# Patient Record
Sex: MALE | Race: BLACK OR AFRICAN AMERICAN | URBAN - METROPOLITAN AREA
[De-identification: names, ages, dates, MRNs, and addresses within clinical notes are randomized per-mention and may not be internally consistent; named-entity substitution may affect disease eponyms.]

---

## 2017-01-30 ENCOUNTER — TELEPHONE (OUTPATIENT)
Dept: SURGERY | Age: 37
End: 2017-01-30

## 2017-01-30 NOTE — TELEPHONE ENCOUNTER
Called patient to schedule 1 yr f/u with [facility]. Patient stated he would need to review his schedule and call us back.

## 2017-11-17 ENCOUNTER — DOCUMENTATION ONLY (OUTPATIENT)
Dept: BARIATRICS/WEIGHT MGMT | Age: 37
End: 2017-11-17

## 2017-11-17 NOTE — PROGRESS NOTES
Per [facility] requirements;  E-mail and letter sent for follow up appointment. Krissy Amin [place] [address] [facility]  DR. CABRERA'S \[facility]\""      Dear Ilsa Leyva,  Your health is our main concern. It is important for your health to have follow-up lab work and to see you surgeon at 3 months, 6 months and annually after your weight loss surgery. Additionally, the Department of bariatric Surgery at our hospital is a member of the [employer] [address] Surgical Quality Improvement Program ([employer] [facility] NSQIP). As a participant in this program, we gather information on the outcomes of our patients after surgery. Please call the office for a follow up appointment at [phone] with [facility] Karen BELTRAN PA-C. If you have moved out of the area or have changed surgeons please call us and let us know the name of your doctor. Your health and feedback are important to us. We greatly appreciate your response.        Thank you,  Krissy Amin [place] [address] [facility]

## 2019-07-16 ENCOUNTER — IMPORTED ENCOUNTER (OUTPATIENT)
Dept: URBAN - NONMETROPOLITAN AREA CLINIC 1 | Facility: CLINIC | Age: 39
End: 2019-07-16

## 2019-07-16 PROBLEM — H52.223: Noted: 2019-07-16

## 2019-07-16 PROBLEM — H52.4: Noted: 2021-11-05

## 2019-07-16 PROBLEM — H02.403: Noted: 2021-11-05

## 2019-07-16 PROBLEM — H35.361: Noted: 2019-07-16

## 2019-07-16 PROBLEM — H52.13: Noted: 2019-07-16

## 2019-07-16 PROCEDURE — 92015 DETERMINE REFRACTIVE STATE: CPT

## 2019-07-16 PROCEDURE — 92004 COMPRE OPH EXAM NEW PT 1/>: CPT

## 2019-07-16 NOTE — PATIENT DISCUSSION
Myopia-Discussed diagnosis with patient. -Explained that people who are myopic are at a higher risk for developing RD/RT and reviewed associated S&S.-Pt to contact our office if symptoms develop. Astigmatism-Discussed diagnosis with patient. Updated spec Rx given. Recommend lens that will provide comfort as well as protect safety and health of eyes. 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 RTC 2 Yr CEE

## 2021-11-05 ENCOUNTER — IMPORTED ENCOUNTER (OUTPATIENT)
Dept: URBAN - NONMETROPOLITAN AREA CLINIC 1 | Facility: CLINIC | Age: 41
End: 2021-11-05

## 2021-11-05 PROCEDURE — 92014 COMPRE OPH EXAM EST PT 1/>: CPT

## 2021-11-05 PROCEDURE — 92015 DETERMINE REFRACTIVE STATE: CPT

## 2021-11-05 NOTE — PATIENT DISCUSSION
MAP OU-Rx issued for Heidy Alvarado. Brochure given. Will call after 1st of the year to get a script sent.

## 2022-04-09 ASSESSMENT — TONOMETRY
OS_IOP_MMHG: 13
OD_IOP_MMHG: 13
OD_IOP_MMHG: 11
OS_IOP_MMHG: 13

## 2022-04-09 ASSESSMENT — VISUAL ACUITY
OU_SC: J1
OD_SC: 20/30+1
OS_PH: 20/30
OS_CC: 20/40+3
OD_CC: 20/25+3
OS_SC: 20/40